# Patient Record
Sex: FEMALE | Race: WHITE | HISPANIC OR LATINO | Employment: UNEMPLOYED | URBAN - METROPOLITAN AREA
[De-identification: names, ages, dates, MRNs, and addresses within clinical notes are randomized per-mention and may not be internally consistent; named-entity substitution may affect disease eponyms.]

---

## 2022-09-21 NOTE — PROGRESS NOTES
Eliseo Cisse; 1995  [id number]      Assessment  27 y o   at 10w0d presenting for amenorrhea  Pregnancy confirmed, dating inconsistent with LMP  Based off , will go off CRL  CEE 23  Plan  Diagnoses and all orders for this visit:    Amenorrhea  -     Prenatal Panel; Future  -     Ambulatory Referral to Maternal Fetal Medicine; Future  -     Ambulatory Referral to Social Work Care Management Program; Future  -     Glucose, 1H PG; Future      - Prenatal vitamin  - Prenatal panel (slips given today)  - Early 1 hr glucola due to large babies in the past  - Discussed genetic screening and MFM referral was placed  - Prenatal Nursing Intake in 2 weeks  - Prenatal H&P in 4 weeks     ____________________________________________________________      Subjective   Iris Em Yap is a 27 y o  Awa Riling with an LMP of 22 who presents for amenorrhea  She notes she took a home pregnancy test and it was positive  She is currently otherwise without complaint  This pregnancy was planned, she had to try for 1 year and 3 months  She has had 2 vaginal deliveries in the past with babies weighing 9lbs 10 oz and 8 lbs 10 oz, and states that she did not have any complications and denies any history of gestational diabetes        Objective  /67 (BP Location: Left arm, Patient Position: Sitting, Cuff Size: Adult)   Pulse 60   Resp 18   Ht 4' 11" (1 499 m)   Wt 64 kg (141 lb)   LMP 2022 (Exact Date)   BMI 28 48 kg/m²       Transvaginal Pelvic Ultrasound  Carter IUP  CRL 3 28cm, consistent with EGA 10w0d  +yolk sac  +cardiac activity   BPM  No adnexal masses appreciated    Esme Blankenship, DO  22

## 2022-09-22 ENCOUNTER — ULTRASOUND (OUTPATIENT)
Dept: OBGYN CLINIC | Facility: CLINIC | Age: 27
End: 2022-09-22

## 2022-09-22 VITALS
RESPIRATION RATE: 18 BRPM | HEIGHT: 59 IN | SYSTOLIC BLOOD PRESSURE: 107 MMHG | HEART RATE: 60 BPM | WEIGHT: 141 LBS | BODY MASS INDEX: 28.43 KG/M2 | DIASTOLIC BLOOD PRESSURE: 67 MMHG

## 2022-09-22 DIAGNOSIS — N91.2 AMENORRHEA: Primary | ICD-10-CM

## 2022-09-22 PROCEDURE — 99203 OFFICE O/P NEW LOW 30 MIN: CPT | Performed by: OBSTETRICS & GYNECOLOGY

## 2022-09-22 PROCEDURE — 76815 OB US LIMITED FETUS(S): CPT | Performed by: OBSTETRICS & GYNECOLOGY

## 2022-09-22 NOTE — PATIENT INSTRUCTIONS
Embarazo   CUIDADO AMBULATORIO:   Lo qué necesita saber sobre el Jarad Redmond: Un embarazo normal dura alrededor de 40 semanas  El primer trimestre dura desde kong último periodo hasta la semana 12 de Jarad Redmond  El susan trimestre se extiende desde la semana 13 hasta la semana 23 de Jarad Redmond  El tercer trimestre se extiende desde la semana 24 de embarazo hasta que nazca kong bebé  Si usted conoce la fecha de knog último periodo, kong médico puede calcular la fecha de nacimiento de kong bebé  Es posible que usted dé a pamela a kong bebé en cualquier momento desde la semana 40 hasta 2 semanas después de la fecha calculada de [place]  Busque atención médica de inmediato si:  Usted presenta un bridget dolor de daniel que no desaparece  Usted tiene [employer] visión nuevos o en aumento, tejas visión borrosa o con manchas  Usted tiene inflamación nueva o creciente en kong marshall o michel  Usted tiene dolor o cólicos en el abdomen o la parte baja de la espalda  Usted tiene sangrado vaginal     Llame a kong médico u obstetra si:  Usted tiene calambres, presión o tensión abdominal     Usted tiene un cambio en la secreción vaginal     Usted no puede retener alimentos ni líquidos y está perdiendo [place]  Usted tiene escalofríos o fiebre  Usted tiene comezón, ardor o dolor vaginal     Usted tiene annetta secreción vaginal amarillenta, verdosa, kim o de [employer]  Usted tiene dolor o ardor al Julian Clos, Sarahi Broach menos de lo habitual o tiene orina rosada o sanguinolenta  Usted tiene preguntas o inquietudes acerca de kong condición o cuidado  Atención prenatal: El cuidado prenatal se trata de annetta serie de visitas con kong médico a lo zeeshan del embarazo  El cuidado prenatal puede ayudar a evitar problemas terence Maury Felton y Eli  Terence cada visita prenatal, kong médico la pesará y examinará kong presión arterial  Kong médico también examinará el Yue Diver y crecimiento de kong bebé   Es posible que usted también necesite lo siguiente en algunas de vineet citas:  Un examen pélvico le permite a kong médico observar kong haroon uterino (la parte inferior de kong [place])  Kong médico usará un espéculo para abrir la vagina  Theodoro Carpen tamaño y la forma de kong [place]  En kong primera visita prenatal, es posible que [employer] mauricio annetta prueba de [place]  Kya es un examen para detectar células anormales en el haroon uterino  Los análisis de davi podrían realizarse para buscar si hay signos de lo siguiente:     Diabetes gestacional o anemia (bajo nivel de jaswinder)    Tipo de davi o factor Rh, o ciertos defectos congénitos    Inmunidad a ciertas enfermedades, tejas la varicela o la Gaila Boothe infección, tejas annetta infección de transmisión sexual, VIH o hepatitis B    La hepatitis B puede necesitar prevención o tratamiento  La hepatitis B es la inflamación del hígado causada por el virus de la hepatitis B (VHB)  El VHB puede transmitirse de Son a kong bebé terence el parto  Se le hará annetta prueba de detección del VHB lo antes posible terence el primer trimestre de cada embarazo  Usted necesita la prueba incluso si recibió la vacuna contra la hepatitis B o si se la hicieron antes  Es posible que necesite que le traten annetta infección por el VHB antes de shashi a pamela  Análisis de orina también podría realizarse para revisarle el azúcar y la proteína  Estas son señales de diabetes gestacional o preeclampsia  También podrían realizarle análisis de orina para revisar si hay signos de infección  La detección de diabetes gestacional podría realizarse  Kong médico le puede ordenar la curva de tolerancia a la glucosa (OGTT) de 1 paso o de 2 pasos  Examen de tolerancia a la glucosa en 1 paso: A usted le revisarán el nivel de azúcar en la davi después de que no haya comido por 8 horas (en ayunas)  Luego le darán a lucy annetta solución de glucosa  Le examinarán el nivel de glucosa nuevamente 1 hora y 2 horas después de terminar la bebida      Examen de tolerancia a la glucosa en 2 paso: Usted no tiene que ayunar para la primera parte del examen  Usted se tomará la bebida de glucosa a cualquier hora del día  A usted le revisarán el nivel de azúcar en la davi al cabo de 1 hora  En gene que el nivel de azúcar esté más alto que cierto nivel, le ordenarán otro examen  Usted tendrá que ayunar para que le revisen el azúcar en kline davi  Usted se tomará la bebida de glucosa  Le examinarán la davi de nuevo 1 [place], 2 horas y 3 horas después de terminarse la bebida de glucosa  Mani Ochoa del feto [place] imágenes del bebé dentro de kline Delia Gareth  Las imágenes se utilizan para verificar el desarrollo, el movimiento y la posición del bebé  Se le pueden ofrecer pruebas de detección de trastornos genéticos  Estos exámenes revisan el riesgo de kline bebé de trastornos genéticos tejas el síndrome de [place]  Un examen de detección podría incluir análisis de davi y Kaaren Lasso  Los análisis de davi pueden usarse para comprobar kline ADN o el de kline eddy  Las [employer] no siempre son exactas o completas  Kline bebé puede nacer con un trastorno genético que no golden aparecido [employer]  Hable con kline médico acerca de cualquier inquietud que usted tenga sobre las [employer]  Cambios corporales que pueden ocurrir terence kline embarazo:  Los cambios en los senos que usted Donnella Rimma sensibilidad y cosquilleo terence la primera parte de kline Sherrilyn Chessman  Los senos se volverán más grandes  Es posible que necesite un sostén con soporte  Es posible que usted salma [facility] secreción delgada y YEMI, conocida tejas calostro, que sale de vineet pezones terence el susan trimestre  El calostro es un líquido que se convertirá en [place] alrededor de 3 días después de usted jose daniel dado a pamela  Cambios en la piel y estrías podrían ocurrir terence kline embarazo  Es posible que usted tenga marcas esquivel, conocidas tejas estrías, en kline piel   Las [employer] se desvanecen después del embarazo  Utilice crema si kong piel está seca y con comezón  La piel de kong marshall, alrededor de los pezones y debajo de kong ombligo podría oscurecerse  La mayoría del Arturo, kong piel Cathalene Poke a kong color normal después del nacimiento de kong bebé  El malestar matutino consiste en náuseas y vómitos que pueden ocurrir en cualquier momento del día  Evite los alimentos grasosos y picantes  Coma comidas pequeñas terence el día en vez de porciones grandes  El jengibre puede ayudar a [employer]  Consulte con kong médico acerca de otras formas para disminuir las náuseas y el vómito  Acidez estomacal puede ser causada por los cambios hormonales terence kong [place]  [place] [facility] en crecimiento puede empujar kong estómago hacia arriba y forzar ácido estomacal a acumularse dentro de kong esófago  [place] 4 o 5 comidas pequeñas cada día en vez de comidas grandes  Evite los alimentos picantes  Evite comer leslie antes de irse a la cama  Estreñimiento puede desarrollarse terence kong embarazo  Para tratar el estreñimiento, coma alimentos altos en fibra tejas cereales con fibra, frijoles, frutas, verduras, panes integrales y [place]  [place] de [place] regular y tome suficiente agua  Es posible que kong médico sugiera un suplemento con fibra para ablandar vineet evacuaciones intestinales  Consulte con kong médico antes de usar cualquier medicamento para disminuir el estreñimiento  Las hemorroides son Derinda Oar grandes en el área rectal  Pueden causar dolor, comezón y sangrado de color olmedo vivo en kong recto  Para disminuir el riesgo de hemorroides, prevenga el estreñimiento y no se esfuerce cuando tenga annetta evacuación intestinal  Si usted tiene hemorroides, sumérjase en annetta bañera con agua tibia para aliviar la incomodidad  Consulte con kong médico cómo puede tratar las hemorroides      Los calambres y la hinchazón en las piernas pueden ser causados por niveles bajos de calcio o por el peso adicional del embarazo  Eleve vineet piernas por encima del nivel de kong corazón para disminuir la hinchazón  Terence un calambre en la pierna, estire o de un masaje al [employer] que tiene el calambre  El calor puede ayudar a disminuir el dolor y los espasmos musculares  Aplique calor sobre el músculo por 20 a 30 minutos cada 2 horas por la cantidad de días que se le indique  Dolor en la espalda puede ocurrir a medida que kong bebé crece  No esté de pie por largos periodos de tiempo ni levante objetos pesados  Use annetta buena postura mientras esté de pie, se agache o se doble  Use zapatos de tacón bajo con un buen soporte  Descansar puede también ayudarla a aliviar el dolor de espalda  Pregunte a kong médico acerca de ejercicios que usted pueda hacer para fortalecer los músculos de kong espalda  Manténgase saludable terence kong embarazo:      Consuma alimentos saludables y variados  Alimentos saludables incluyen frutas, verduras, panes de devin integral, alimentos lácteos bajos en grasa, frijoles, manuel magras y pescado  [facility] líquidos tejas se le haya indicado  Pregunte cuánto líquido debe lucy cada día y cuáles líquidos son los más adecuados para usted  Limite el consumo de cafeína a menos de [place] [address]  Limite el consumo de pescado a 2 porciones cada semana  Escoja pescado con concentraciones bajas de guerita tejas atún ligero enlatado, camarón, cangrejo, salmón, bacalao o tilapia  No coma pescado con concentraciones altas de guerita tejas pez saige, caballa gigante, pargo rayado y tiburón  [id number] [place]  Kong necesidad de ciertas vitaminas y [address] [facility], tejas el ácido fólico, aumenta terence el [facility]  Las vitaminas prenatales proporcionan algunas de las vitaminas y minerales adicionales que usted necesita  Las vitaminas prenatales también podrían ayudar a disminuir el riesgo de ciertos defectos de nacimiento           Pregunte cuánto peso usted debe aumentar terence kong embarazo  Demasiado aumento de peso o muy poco puede ser poco saludable para usted y kline bebé  Consulte con kline médico acerca de hacer ejercicio  El ejercicio moderado puede ayudarla a mantenerse en forma  Kline médico la ayudará a planear un programa de ejercicios que sea seguro para usted terence kline [place]  No fume  El tabaquismo aumenta el riesgo de un aborto espontáneo y de problemas cardíacos y vasculares  Fumar puede causar que kline bebé nazca antes de tiempo o que pese menos al nacer  Deje de fumar tan pronto tejas usted crea que podría estar embarazada  Solicite información a kline médico si usted necesita ayuda para dejar de fumar  No consuma alcohol  El alcohol pasa de kline cuerpo al bebé a través de la placenta  Puede afectar el desarrollo del cerebro de kline bebé y provocar el síndrome de alcoholismo fetal (SAF)  El SAF es un karen de condiciones que causan problemas [place], de comportamiento y de crecimiento  Consulte con kline médico antes de lucy cualquier medicamento  Muchos medicamentos pueden perjudicar a kline bebé si usted los ben [address]  No tome ningún medicamento, vitaminas, hierbas o suplementos sin quincy consultar con kline Kathaleen Lloyd  use drogas ilegales o de la fulton (tejas marihuana o cocaína) mientras está embarazada  Consejos de seguridad:  Evite jacuzzis y saunas  No use un jacuzzi o un sauna mientras usted está embarazada, especialmente terence el primer trimestre  Los [facility] y los saunas aumentan la temperatura de kline bebé y el riesgo de defectos de nacimiento  Evite la toxoplasmosis  [place] es annetta infección causada por comer carne cruda o estar cerca del excremento de un anu infectado  [place] puede causar malformaciones congénitas, aborto espontáneo y Moshe Popeese las michel después de tocar carne cruda  Asegúrese de que la carne esté francois cocida antes de comerla  Evite los huevos crudos y la [place]   Use guantes o pida que alguien la ayude a limpiar la caja de arena del anu mientras usted [place]  Consulte con kong médico acerca de viajar  El [address] cómodo para viajar es terence el susan trimestre  Pregunte a kong médico si usted puede viajar después de las 36 semanas  Es posible que no pueda viajar en avión después de las 36 [facility]  También le puede recomendar que evite los viajes largos por carretera  Acuda a vineet consultas de control con kong médico u obstetra según le indicaron: [place] a todas vineet citas prenatales terence kong embarazo  Anote vineet preguntas para que se acuerde de hacerlas terence vineet visitas  © Copyright [employer] 2022 Information is for End User's use only and may not be sold, redistributed or otherwise used for commercial purposes  All illustrations and images included in CareNotes® are the copyrighted property of A D A M , Inc  or [address] [place] [address] es sólo para uso en educación  Kong intención no es darle un consejo médico sobre enfermedades o tratamientos  Colsulte con kong [employer] farmacéutico antes de seguir cualquier régimen médico para saber si es seguro y efectivo para usted

## 2022-09-23 ENCOUNTER — PATIENT OUTREACH (OUTPATIENT)
Dept: OBGYN CLINIC | Facility: CLINIC | Age: 27
End: 2022-09-23

## 2022-09-23 NOTE — PROGRESS NOTES
NIURKA PETERSON spoke with 28 y/o- S- G3:P2-  Iranian speaking woman for pre  assessment  Pt resides with FOB, 5 and 10 y/o sons and her mom  Pt reported this is an intended pregnancy and both her and FOB are happy  Pt denies any usage of drug, alcohol or smoking  Pt denies any Mental Health or Domestic Violence history  Pt in need to apply for FSF  NIURKA PETERSON will referred to the [facility]  Pt is employed at [employer] and denies financial concerns at present time  Pt denies transportation issues  Pt claimed FOB and Mom are her main support  Pt main concerns is the N/V  NIURKA PETERSON consulted with CELSA Trinh on Pt's behalf  Pt to take Unisom and B6 for N/V, Acetaminophen for H/A and to go to the ED if not able to hold any liquids and symptoms worsen  Pt verbalized understanding  NIURKA PETERSON explained her role and gave contact information  Pt denies other concerns  NIURKA PETERSON encouargd Pt to call at any time needed